# Patient Record
Sex: FEMALE | Race: WHITE | NOT HISPANIC OR LATINO
[De-identification: names, ages, dates, MRNs, and addresses within clinical notes are randomized per-mention and may not be internally consistent; named-entity substitution may affect disease eponyms.]

---

## 2024-03-22 PROBLEM — Z00.00 ENCOUNTER FOR PREVENTIVE HEALTH EXAMINATION: Status: ACTIVE | Noted: 2024-03-22

## 2024-03-25 ENCOUNTER — APPOINTMENT (OUTPATIENT)
Age: 34
End: 2024-03-25
Payer: COMMERCIAL

## 2024-03-25 ENCOUNTER — NON-APPOINTMENT (OUTPATIENT)
Age: 34
End: 2024-03-25

## 2024-03-25 VITALS
SYSTOLIC BLOOD PRESSURE: 96 MMHG | WEIGHT: 128 LBS | HEIGHT: 67 IN | BODY MASS INDEX: 20.09 KG/M2 | OXYGEN SATURATION: 100 % | DIASTOLIC BLOOD PRESSURE: 63 MMHG | RESPIRATION RATE: 18 BRPM | HEART RATE: 70 BPM

## 2024-03-25 DIAGNOSIS — B00.2 HERPESVIRAL GINGIVOSTOMATITIS AND PHARYNGOTONSILLITIS: ICD-10-CM

## 2024-03-25 DIAGNOSIS — Z32.00 ENCOUNTER FOR PREGNANCY TEST, RESULT UNKNOWN: ICD-10-CM

## 2024-03-25 PROCEDURE — 99204 OFFICE O/P NEW MOD 45 MIN: CPT | Mod: 25

## 2024-03-25 PROCEDURE — 76830 TRANSVAGINAL US NON-OB: CPT

## 2024-03-25 NOTE — PHYSICAL EXAM
[Chaperone Present] : A chaperone was present in the examining room during all aspects of the physical examination [FreeTextEntry4] : no old blood visualized, no bleeding on valsalva

## 2024-03-25 NOTE — HISTORY OF PRESENT ILLNESS
[FreeTextEntry1] : 34yo  at 9w2d by LMP: 22 here for initiation of prenatal care. Patient was being seen at [facility] and monitoring ovulation cycles to help with TTC. Patient has confirmed IUP, DEBBI: 10/26/24. Initial prenatal labs in 2023 normal/negative, will repeat today. [employer] carrier tested positive for 2 FOB not positive for same ones. Went to [facility] last week had 2 episodes of light pink spotting, concerned about Zika did get multiple mosquito bites, will draw antibodies since just returned yesterday. Currently, denies nausea, vomiting, vaginal bleeding or abdominal cramping/pain.   OB: G1 GYN: LMP: 24, irregular, denies h/o fibroid or ovarian cysts, Pap smear  NILM PMhx: Hypothyroidism Sx: denies All:denies Meds: Synthroid 50mcg, PNV

## 2024-03-25 NOTE — PROCEDURE
[Transvaginal OB Sonogram] : Transvaginal OB Sonogram [Intrauterine Pregnancy] : intrauterine pregnancy [Yolk Sac] : yolk sac present [CRL: ___ (mm)] : CRL - [unfilled]Umm [Fetal Heart] : fetal heart present [Date: ___] : Date: [unfilled] [Current GA by Sonogram: ___ (wks)] : Current GA by Sonogram: [unfilled]Uwks [___ day(s)] : [unfilled] days [Transvaginal OB Sonogram WNL] : Transvaginal OB Sonogram WNL

## 2024-03-25 NOTE — PLAN
[FreeTextEntry1] : 1st trimester:  - Initial OB panel  - NIPT ~10 weeks - NT by 13 weeks referral given - Pap smear uptodate -WIll want Zika antibody testing -Will need to do thyroid studies q4 weeks  2nd trimester:  - AFP at 16-18 weeks - Level 2 18-22 weeks - GCT 24-28 weeks - Tdap at 27 weeks  3rd trimester: - Rhogam at 28 weeks (if indicated) - 3rd trimester labs  - Valtrex at 36 weeks (if indicated) - GBS at 36 weeks  RTO 1-2 weeks for NIPT RTO 4 weeks for MD visit

## 2024-03-26 ENCOUNTER — TRANSCRIPTION ENCOUNTER (OUTPATIENT)
Age: 34
End: 2024-03-26

## 2024-03-26 LAB
BASOPHILS # BLD AUTO: 0.02 K/UL
BASOPHILS NFR BLD AUTO: 0.3 %
CANDIDA VAG CYTO: NOT DETECTED
EOSINOPHIL # BLD AUTO: 0.16 K/UL
EOSINOPHIL NFR BLD AUTO: 2 %
G VAGINALIS+PREV SP MTYP VAG QL MICRO: NOT DETECTED
HCT VFR BLD CALC: 34.5 %
HGB BLD-MCNC: 11.4 G/DL
HIV1+2 AB SPEC QL IA.RAPID: NONREACTIVE
IMM GRANULOCYTES NFR BLD AUTO: 0.1 %
LYMPHOCYTES # BLD AUTO: 1.79 K/UL
LYMPHOCYTES NFR BLD AUTO: 22.4 %
MAN DIFF?: NORMAL
MCHC RBC-ENTMCNC: 32.9 PG
MCHC RBC-ENTMCNC: 33 GM/DL
MCV RBC AUTO: 99.7 FL
MONOCYTES # BLD AUTO: 0.75 K/UL
MONOCYTES NFR BLD AUTO: 9.4 %
NEUTROPHILS # BLD AUTO: 5.27 K/UL
NEUTROPHILS NFR BLD AUTO: 65.8 %
PLATELET # BLD AUTO: 208 K/UL
RBC # BLD: 3.46 M/UL
RBC # FLD: 12.9 %
T VAGINALIS VAG QL WET PREP: NOT DETECTED
WBC # FLD AUTO: 8 K/UL

## 2024-03-27 LAB
ABO + RH PNL BLD: NORMAL
BLD GP AB SCN SERPL QL: NORMAL
C TRACH RRNA SPEC QL NAA+PROBE: NOT DETECTED
N GONORRHOEA RRNA SPEC QL NAA+PROBE: NOT DETECTED
SOURCE AMPLIFICATION: NORMAL
T PALLIDUM AB SER QL IA: NEGATIVE

## 2024-03-28 LAB
HSV 1 IGG TYPE-SPECIFIC AB: 47.7 INDEX
HSV 2 IGG TYPE-SPECIFIC AB: <0.9 INDEX

## 2024-03-29 ENCOUNTER — TRANSCRIPTION ENCOUNTER (OUTPATIENT)
Age: 34
End: 2024-03-29

## 2024-03-29 LAB — BACTERIA UR CULT: ABNORMAL

## 2024-03-29 RX ORDER — NITROFURANTOIN MACROCRYSTALS 100 MG/1
100 CAPSULE ORAL TWICE DAILY
Qty: 14 | Refills: 0 | Status: ACTIVE | COMMUNITY
Start: 2024-03-29 | End: 1900-01-01

## 2024-04-01 ENCOUNTER — APPOINTMENT (OUTPATIENT)
Age: 34
End: 2024-04-01

## 2024-04-22 ENCOUNTER — APPOINTMENT (OUTPATIENT)
Age: 34
End: 2024-04-22